# Patient Record
Sex: MALE | Race: BLACK OR AFRICAN AMERICAN
[De-identification: names, ages, dates, MRNs, and addresses within clinical notes are randomized per-mention and may not be internally consistent; named-entity substitution may affect disease eponyms.]

---

## 2019-03-22 ENCOUNTER — HOSPITAL ENCOUNTER (EMERGENCY)
Dept: HOSPITAL 52 - LL.ED | Age: 44
Discharge: HOME | End: 2019-03-22
Payer: COMMERCIAL

## 2019-03-22 DIAGNOSIS — S01.81XA: Primary | ICD-10-CM
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
## 2019-03-22 DIAGNOSIS — F17.210: ICD-10-CM

## 2019-03-22 DIAGNOSIS — I10: ICD-10-CM

## 2019-03-22 DIAGNOSIS — K02.9: ICD-10-CM

## 2019-03-22 DIAGNOSIS — Z23: ICD-10-CM

## 2019-03-22 NOTE — EDM.PDOC
ED HPI GENERAL MEDICAL PROBLEM





- General


Chief Complaint: General


Stated Complaint: NOSE LACERATION


Time Seen by Provider: 03/22/19 06:10


Source of Information: Reports: Patient.  Denies: Old Records (No [facility] records available)


History Limitations: Reports: No Limitations





- History of Present Illness


INITIAL COMMENTS - FREE TEXT/NARRATIVE: 





The patient was brought to the emergency room via transport vehicle from [facility] 

for evaluation of a Workmen's Compensation injury with a small nasal laceration

, which occurred at work at about 5 AM. Note the patient's accidentally caught 

his nose on a hoist with no contusion, other head injury, headaches, visual 

changes, foreign body, neurological  deficits, or other complaints or injuries. 

No recent history of abdominal pain, heartburn, nausea, diarrhea, melena, gross 

hematochezia, or any food intolerance, including fatty foods, etc.. The patient 

also denies any recent fever, cough, wheezing, dyspnea, etc.. He does 


not have a previous history of hypertension. No history of fall, etc.


Onset: Today, Sudden


Onset Date: 03/22/19


Onset Time: 05:00


Duration: Constant


Location: Reports: Face.  Denies: Head, Neck, Chest, Abdomen, Back, Pelvis, 

Upper Extremity, Left, Upper Extremity, Right


Quality: Reports: Same as Previous Episode, Throbbing


Severity: Mild


Improves with: Reports: None


Worsens with: Reports: None


Context: Reports: Trauma


Associated Symptoms: Reports: No Other Symptoms.  Denies: Confusion, Chest Pain

, Cough, Diaphoresis, Fever/Chills, Headaches, Loss of Appetite, Malaise, Nausea

/Vomiting, Rash, Seizure, Shortness of Breath, Syncope, Weakness


Treatments PTA: Reports: Other (see below) (None)


  ** Left Nose


Pain Score (Numeric/FACES): 5





- Related Data


 Allergies











Allergy/AdvReac Type Severity Reaction Status Date / Time


 


No Known Allergies Allergy   Verified 03/22/19 06:04











Home Meds: 


 Home Meds





. [No Known Home Meds]  03/22/19 [History]











Past Medical History





- Past Health History


Medical/Surgical History: Denies Medical/Surgical History


Cardiovascular History: Denies: Hypertension





Social & Family History





- Tobacco Use


Smoking Status *Q: Current Some Day Smoker


Tobacco Use Within Last Twelve Months: Cigarettes


Years of Tobacco use: 1


Packs/Tins Daily: 0.2


Used Tobacco, but Quit: No


Smoking Cessation Information Provided To Patient: Yes





- Caffeine Use


Caffeine Use: Reports: Coffee (1 cup per day), Energy Drinks (2 cans per week), 

Soda (2 sodas per week).  Denies: Tea





- Living Situation & Occupation


Occupation: Employed ([employer])





ED ROS GENERAL





- Review of Systems


Review Of Systems: ROS reveals no pertinent complaints other than HPI.





ED EXAM, GENERAL





- Physical Exam


Exam: See Below


Exam Limited By: No Limitations


General Appearance: Alert, WD/WN, No Apparent Distress


Eye Exam: Bilateral Eye: EOMI, Normal Inspection (No nystagmus), PERRL


Ears: Normal External Exam, Normal Canal, Hearing Grossly Normal, Normal TMs


Nose: Other (1 cm in length irregular laceration/tear over the left distal 

superior nasal region and naris with only minimal involvement of the inner 

mucosa).  No: No Blood (Minimal spotting and bleeding), Nasal Deformity, Nasal 

Swelling, Nasal Drainage, Nasal Flaring


Throat/Mouth: Normal Lips, Normal Gums, Normal Oropharynx, Normal Voice, No 

Airway Compromise.  No: Normal Teeth (Multiple missing teeth and broken teeth 

into the gumline including caries but no acute abscess, drainage, etc.), 

Dysphagia, Perioral Cyanosis


Head: Atraumatic, Normocephalic.  No: Facial Swelling, Facial Tenderness, Sinus 

Tenderness


Neck: Normal Inspection, Supple, Non-Tender, Full Range of Motion.  No: 

Lymphadenopathy (L), Lymphadenopathy (R), Thyromegaly


Respiratory/Chest: No Respiratory Distress, Lungs Clear, Normal Breath Sounds, 

No Accessory Muscle Use, Chest Non-Tender.  No: Pleural Rub, Retractions


Cardiovascular: Normal Peripheral Pulses, Regular Rate, Rhythm, No Edema, No 

Gallop, No JVD, No Murmur, No Rub.  No: Gallop/S3, Gallop/S4, Friction Rub


Peripheral Pulses: 2+: Radial (L), Radial (R)


GI/Abdominal: Normal Bowel Sounds, Soft, Non-Tender, No Organomegaly, No 

Distention, No Abnormal Bruit, No Mass, Pelvis Stable


 (Male) Exam: Deferred


Rectal (Males) Exam: Deferred


Back Exam: Normal Inspection, Full Range of Motion.  No: CVA Tenderness (L), 

CVA Tenderness (R), Muscle Spasm


Extremities: Normal Inspection, Normal Range of Motion, Non-Tender, No Pedal 

Edema, Normal Capillary Refill.  No: Nico's Sign


Neurological: Alert, Oriented, CN II-XII Intact, Normal Cognition, Normal Gait, 

No Motor/Sensory Deficits


Psychiatric: Normal Affect, Normal Mood


Skin Exam: Warm, Dry, Intact, Normal Color, No Rash, Stud(s), Tattoo(s), Wound/

Incision (As above).  No: Diaphoretic


Lymphatic: No Adenopathy





ED GENERAL MEDICAL PROCEDURES





- Laceration/Wound Repair


  ** Left Distal Nose


Lac/wound length in cm: 1.0


Appearance: Superficial, Stellate, Irregular


Distal NVT: Neuro & Vascular Intact, No Tendon Injury


Anesthetic Type: Other (None)


Skin Prep: Providone-Iodine (Betadine)


Saline irrigation (cc's): 0


Exploration/Debridement/Repair: Wound Explored, In a Bloodless Field, Explored 

to Base, No Foreign Material Found, Multiple Flaps Aligned


Closed with: Wound Adhesive


Sterile Dressing Applied: None


Tetanus Status Addressed: Yes


Complications: No





Course





- Vital Signs


Last Recorded V/S: 


 Last Vital Signs











Temp  36.8 C   03/22/19 05:54


 


Pulse  64   03/22/19 05:54


 


Resp  16   03/22/19 05:54


 


BP  146/90 H  03/22/19 06:05


 


Pulse Ox  100   03/22/19 05:54











 Vital Signs - 24 hr











  03/22/19 03/22/19





  05:54 06:05


 


Temperature [ 36.8 C 





Temporal]  


 


Pulse, 64 





Peripheral [  





Pulse Oximetry]  


 


Respiratory 16 





Rate  


 


Blood Pressure 140/94 H 146/90 H





[Right Upper  





Arm]  


 


O2 Sat by Pulse 100 





Oximetry  














- Orders/Labs/Meds


Orders: 


 Active Orders 24 hr











 Category Date Time Status


 


 Vaccines to be Administered [RC] PER UNIT ROUTINE Care  03/22/19 06:24 Active


 


 Obtain Past Medical Record [OM.PC] Routine Oth  03/22/19 06:24 Active











Labs: 


None


Meds: 


Medications














Discontinued Medications














Generic Name Dose Route Start Last Admin





  Trade Name Freq  PRN Reason Stop Dose Admin


 


Diphtheria/Tetanus/Acell Pertussis  0.5 ml  03/22/19 06:24  03/22/19 06:31





  Adacel  IM  03/22/19 06:25  0.5 ml





  .ONCE ONE   Administration





     





     





     





     








Dermabond





- Radiology Interpretation


Free Text/Narrative:: 





None





Departure





- Departure


Time of Disposition: 07:00


Disposition: Home, Self-Care 01


Condition: Good


Clinical Impression: 


 Laceration, Blood pressure elevated without history of HTN, Tobacco abuse 

counseling, Caries








- Discharge Information


*PRESCRIPTION DRUG MONITORING PROGRAM REVIEWED*: Not Applicable


*COPY OF PRESCRIPTION DRUG MONITORING REPORT IN PATIENT LIT: Not Applicable


Instructions:  Steps to Quit Smoking, Easy-to-Read, Tissue Adhesive Wound Care, 

Easy-to-Read


Referrals: 


PCP,None [Primary Care Provider] - 


Forms:  ED Department Discharge


Additional Instructions: 


1.  Follow up with your regular provider in 10-14 days as needed, if symptoms 

persist. Bring these discharge instructions with you to that visit..


2.  Tylenol 650 mg by mouth every 4 hours and/or OTC ibuprofen 2-3 tabs by 

mouth every 6 hours with food as directed./needed. You may stagger these 

medications for 48-72 hours only, which essentially means that you are 

receiving a pain medication about every 2 hours.


3.  Continue to observe your blood pressure closely through company nurse and 

regular provider.


4.  Stop all tobacco use ASAP as directed/per provided information and consider 

contacting Quit LIne, etc..


5.  Immediately after this visit verify that your cellular telephone's 

voicemail has been activated and is empty. Also verify that your  home telephone

's answering machine is operating properly and has space to receive messages. 

Note that it is sometimes necessary for us to be able to contact you at a later 

date to discuss your medical care.


6.  Please remember that we are ALWAYS here for you and want to answer any 

questions you may have. Feel free to call the hospital any time and we call you 

back ASAP. 


7.  Work excuse- See Form


8.  Follow-up with your dentist ASAP as discussed.





- Problem List & Annotations


(1) Laceration


SNOMED Code(s): 413108668


   Code(s): AZI2694 -    Status: Acute   Priority: High   Current Visit: Yes   

Onset Date: 03/22/19   Annotation/Comment:: Good results with laceration repair 

as above. Wound care discussed. [employer] Workmen's Compensation forms were 

completed. DTaP was given.   





(2) Caries


SNOMED Code(s): 94147411


   Code(s): K02.9 - DENTAL CARIES, UNSPECIFIED   Status: Chronic   Priority: 

Medium   Current Visit: Yes   Annotation/Comment:: The patient was advised to 

follow-up with the dentist ASAP with applications of caries discussed.   





(3) Blood pressure elevated without history of HTN


SNOMED Code(s): 178185549


   Code(s): R03.0 - ELEVATED BLOOD-PRESSURE READING, W/O DIAGNOSIS OF HTN   

Status: Acute   Priority: Medium   Current Visit: Yes   Annotation/Comment:: 

Elevated blood pressure today with no history of hypertension. Close follow-up 

with regular provider, etc. as per discharge instructions. Decreased caffeine 

intake, etc. discussed including avoidance of energy drinks, etc.   





(4) Tobacco abuse counseling


SNOMED Code(s): 295036640, 324018404, 271588730


   Code(s): Z71.6 - TOBACCO ABUSE COUNSELING   Status: Chronic   Priority: 

Medium   Current Visit: Yes   Annotation/Comment:: Tobacco cessation strongly 

encouraged with information provided at discharge.   





- Problem List Review


Problem List Initiated/Reviewed/Updated: Yes





- My Orders


Last 24 Hours: 


My Active Orders





03/22/19 06:24


Vaccines to be Administered [RC] PER UNIT ROUTINE 


Obtain Past Medical Record [OM.PC] Routine 














- Assessment/Plan


Last 24 Hours: 


My Active Orders





03/22/19 06:24


Vaccines to be Administered [RC] PER UNIT ROUTINE 


Obtain Past Medical Record [OM.PC] Routine 











Assessment:: 





As above


Plan: 





As above. Extensive precautions were given to the patient, who is in agreement 

with the treatment plan. See Patient Instructions for further treatment and 

plan.

## 2020-08-24 NOTE — EDM.PDOC
ED HPI GENERAL MEDICAL PROBLEM





- General


Chief Complaint: Back Pain or Injury


Stated Complaint: lower left back injury


Time Seen by Provider: 08/24/20 05:00


Source of Information: Reports: Patient, Old Records ([facility]

EMR.  No paper hospital chart available.)


History Limitations: Reports: No Limitations





- History of Present Illness


INITIAL COMMENTS - FREE TEXT/NARRATIVE: 





The patient was brought to the emergency room via transport vehicle from [facility] 

for evaluation of a Workmen's Compensation injury, which occurred at about 12:30

AM this morning.  The patient was lifting a 50 pound tire when he had a twisting

lower back injury.  He did take 500 mg of Tylenol at 1 AM with no improvement in

symptoms.  He complains of 10/10 left-sided low back pain with minimal 

paresthesias but no significant sciatica, muscle weakness, neurological 

deficits, fall, other injury, or other complaints.  No recent history of 

abdominal pain, heartburn, nausea, diarrhea, melena, gross hematochezia, or any 

food intolerance, including fatty foods, etc..  The patient also denies any 

recent fever, cough, wheezing, dyspnea, etc..


Onset: Today, Sudden


Onset Date: 08/24/20


Onset Time: 00:30


Duration: Constant, Getting Worse


Location: Reports: Back.  Denies: Head, Face, Neck, Chest, Abdomen, Pelvis, 

Upper Extremity, Left, Upper Extremity, Right, Lower Extremity, Left, Lower 

Extremity, Right, Generalized, Radiates to


Quality: Reports: Same as Previous Episode, Sharp, Stabbing, Other (Spasms)


Severity: Severe


Improves with: Reports: None


Worsens with: Reports: None


Context: Reports: Lifting, Other (As above).  Denies: Sick Contact


Associated Symptoms: Denies: Confusion, Chest Pain, Cough, Diaphoresis, 

Headaches, Loss of Appetite, Nausea/Vomiting, Shortness of Breath, Syncope, 

Weakness


Treatments PTA: Reports: Acetaminophen


  ** lower left back


Pain Score (Numeric/FACES): 10





- Related Data


                                    Allergies











Allergy/AdvReac Type Severity Reaction Status Date / Time


 


No Known Allergies Allergy   Verified 08/24/20 04:40











Home Meds: 


                                    Home Meds





Cyclobenzaprine [Flexeril] 10 mg PO TID PRN #30 tab 08/24/20 [Rx]











Past Medical History





- Past Health History


Medical/Surgical History: Denies Medical/Surgical History


Cardiovascular History: Denies: Hypertension





Social & Family History





- Tobacco Use


Smoking Status *Q: Current Every Day Smoker


Tobacco Use Within Last Twelve Months: Cigarettes


Years of Tobacco use: 2


Packs/Tins Daily: 0.3


Used Tobacco, but Quit: No


Smoking Cessation Information Provided To Patient: Yes


Second Hand Smoke Exposure: No


Second Hand Smoke Education Provided: No





- Caffeine Use


Caffeine Use: Reports: Coffee (1 cup per day), Energy Drinks (2 cans per week), 

Soda (2 sodas per week).  Denies: Tea





- Living Situation & Occupation


Living situation: Reports: Single (3 children), Alone


Occupation: Employed ([employer])





ED ROS GENERAL





- Review of Systems


Review Of Systems: Comprehensive ROS is negative, except as noted in HPI.





ED EXAM,LOWER BACK PAIN/INJURY





- Physical Exam


Exam: See Below (Every had problems with blood pressure being elevated now just 

come in when you hurt your home)


Exam Limited By: No Limitations


General Appearance: Alert, WD/WN, No Apparent Distress


Head: Atraumatic, Normocephalic


Neck: Normal Inspection, Supple, Non-Tender, Full Range of Motion.  No: Ly

mphadenopathy (L), Lymphadenopathy (R), Thyromegaly


Respiratory/Chest: No Respiratory Distress, Lungs Clear, Normal Breath Sounds, 

No Accessory Muscle Use, Chest Non-Tender.  No: Pleural Rub, Retractions


Cardiovascular: Normal Peripheral Pulses, Regular Rate, Rhythm, No Edema, No 

Gallop, No JVD, No Murmur, No Rub.  No: Gallop/S3, Gallop/S4, Friction Rub


GI/Abdominal: Normal Bowel Sounds, Soft, Non-Tender, No Organomegaly, No 

Distention, No Abnormal Bruit, No Mass.  No: Guarding


 (Male) Exam: Deferred


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 

dangerous stuff going on in their home)


Back Exam: Decreased Range of Motion (Secondary to pain), Muscle Spasm (Mild 

left lower lumbar region), Paraspinal Tenderness (Moderate left lower lumbar 

region).  No: CVA Tenderness (L), CVA Tenderness (R), Vertebral Tenderness


Extremities: Normal Inspection, Normal Range of Motion, Non-Tender, No Pedal 

Edema, Normal Capillary Refill.  No: Nico's Sign


Neurological: Alert, Normal Mood/Affect, Normal Dorsiflexion, CN II-XII Intact, 

Normal Plantar Flexion, Normal Gait, Normal Reflexes, No Motor/Sensory Deficits,

 Oriented x 3


Psychiatric: Normal Affect, Normal Mood


Skin Exam: Warm, Dry, Intact, Normal Color, No Rash, Tattoo(s) (Multiple).  No: 

Diaphoretic, Wound/Incision


Lymphatic: No Adenopathy





Course





- Vital Signs


Last Recorded V/S: 


                                Last Vital Signs











Temp  37.1 C   08/24/20 04:44


 


Pulse  88   08/24/20 04:44


 


Resp  18   08/24/20 04:44


 


BP  151/95 H  08/24/20 04:57


 


Pulse Ox  100   08/24/20 04:44











                               Vital Signs - 24 hr











  08/24/20 08/24/20 08/24/20





  04:44 04:57 05:28


 


Temperature [ 37.1 C  





Temporal]   


 


Pulse, 88  





Peripheral [   





Pulse Oximetry]   


 


Respiratory 18  





Rate   


 


Blood Pressure 136/100 H 151/95 H 140/88





[Left Upper Arm   





]   


 


O2 Sat by Pulse 100  





Oximetry   














- Orders/Labs/Meds


Orders: 


                               Active Orders 24 hr











 Category Date Time Status


 


 Lumbar Spine Min 4V [CR] Stat Exams  08/24/20 05:23 Ordered











Labs: 


None


Meds: 


Medications














Discontinued Medications














Generic Name Dose Route Start Last Admin





  Trade Name Freq  PRN Reason Stop Dose Admin


 


Ketorolac Tromethamine  60 mg  08/24/20 05:08  08/24/20 05:10





  Toradol  IM  08/24/20 05:09  60 mg





  ONETIME ONE   Administration














- Radiology Interpretation


Free Text/Narrative:: 





X-rays of the lumbar spine, complete including obliques, shows evidence of mild 

decreased lordosis, however no evidence of fracture, dislocation, etc..  Mildly 

suboptimal views secondary to multiple complaints, however adequate for 

evaluation.





Departure





- Departure


Time of Disposition: 05:55


Disposition: Home, Self-Care 01


Condition: Good


Clinical Impression: 


 Tobacco abuse counseling, Blood pressure elevated without history of HTN





Low back pain


Qualifiers:


 Chronicity: acute Back pain laterality: left Sciatica presence: without 

sciatica Qualified Code(s): M54.5 - Low back pain








- Discharge Information


*PRESCRIPTION DRUG MONITORING PROGRAM REVIEWED*: Not Applicable


*COPY OF PRESCRIPTION DRUG MONITORING REPORT IN PATIENT LIT: Not Applicable


Prescriptions: 


Cyclobenzaprine [Flexeril] 10 mg PO TID PRN #30 tab


 PRN Reason: Spasms


Instructions:  Steps to Quit Smoking, Easy-to-Read, Health Risks of Smoking, 

Muscle Strain, Easy-to-Read, Hypertension, Adult, Easy-to-Read, Back Injury Prev

ention


Forms:  ED Department Discharge


Additional Instructions: 


1.  Followup with your regular provider in 7-10 days as directed. Bring these 

discharge instructions with you to that visit.


2.  Tylenol 650 mg by mouth every 4 hours and/or OTC ibuprofen 2-3 tabs by mouth

 every 6 hours with food as directed./needed. You may stagger these medications 

for 48-72 hours only, which essentially means that you are receiving a pain 

medication about every 2 hours.  Next dose of ibuprofen as needed in 6 hours 

secondary to medications given in the emergency room.


3.  BenGay or equivalent, heating pad, and/or ice packs as directed.


4.  Advance activity slowly as directed with initial 10 pound lifting 

restriction.


5.  Work excuse- See Form


6.  Stop all tobacco use ASAP as directed/per provided information and consider 

contacting Quit LIne, etc..


7.  Have your regular provider observe your blood pressures and pulses closely


8.  Sedation, dry mouth, etc. precautions with Flexeril/cyclobenzaprine as 

discussed.


9.  Immediately after this visit verify that your cellular telephone's voicemail

 has been activated and is empty. Also verify that your  home telephone's 

answering machine is operating properly and has space to receive messages. Note 

that it is sometimes necessary for us to be able to contact you at a later date 

to discuss your medical care.


10.  Please remember that we are ALWAYS here for you and want to answer any 

questions you may have. Feel free to call the hospital any time and we call you 

back ASAP. 





Sepsis Event Note (ED)





- Evaluation


Sepsis Screening Result: No Definite Risk





- Focused Exam


Vital Signs: 


                                   Vital Signs











  Temp Pulse Resp BP Pulse Ox


 


 08/24/20 04:57     151/95 H 


 


 08/24/20 04:44  37.1 C  88  18  136/100 H  100














- Problem List & Annotations


(1) Low back pain


SNOMED Code(s): 274570448


   Code(s): M54.5 - LOW BACK PAIN   Status: Acute   Priority: High   Onset Date:

 08/24/20   Annotation/Comment:: IM Toradol given as above.  Symptomatic relief 

as per discharge instructions.  Note that the patient did have a similar injury 

at work about 2 months ago with [facility] [place] 

with x-rays at that time and also referral to a chiropractor by patient history.

  [employer] work excuse and Workmen's Compensation forms were completed.  Activity 

restrictions were discussed.  Sedation precautions with Flexeril were discussed.

   


Qualifiers: 


   Chronicity: acute   Back pain laterality: left   Sciatica presence: without 

sciatica   Qualified Code(s): M54.5 - Low back pain   





(2) Blood pressure elevated without history of HTN


SNOMED Code(s): 550663234


   Code(s): R03.0 - ELEVATED BLOOD-PRESSURE READING, W/O DIAGNOSIS OF HTN   

Status: Acute   Priority: Medium   Annotation/Comment:: Elevated blood pressure 

once again today with no history of hypertension.  Blood pressure improved prior

 to discharge without treatment other than Toradol as above. Close follow-up 

with regular provider, etc. as per discharge instructions. Decreased caffeine 

intake, etc. discussed including avoidance of energy drinks, etc.   





(3) Tobacco abuse counseling


SNOMED Code(s): 787087639, 002526625, 366145115


   Code(s): Z71.6 - TOBACCO ABUSE COUNSELING   Status: Chronic   Priority: 

Medium   Annotation/Comment:: Tobacco cessation once again strongly encouraged 

with information provided at discharge.   





- Problem List Review


Problem List Initiated/Reviewed/Updated: Yes





- My Orders


Last 24 Hours: 


My Active Orders





08/24/20 05:23


Lumbar Spine Min 4V [CR] Stat 














- Assessment/Plan


Last 24 Hours: 


My Active Orders





08/24/20 05:23


Lumbar Spine Min 4V [CR] Stat 











Assessment:: 





As above


Plan: 





As above.  Extensive precautions were given to the patient, who is in agreement 

with the treatment plan.  See Patient Instructions for further treatment and 

plan.

## 2021-05-04 NOTE — EDM.PDOC
ED HPI GENERAL MEDICAL PROBLEM





- General


Chief Complaint: Upper Extremity Injury/Pain


Stated Complaint: left shoulder


Time Seen by Provider: 05/04/21 02:10


Source of Information: Reports: Patient


History Limitations: Reports: No Limitations





- History of Present Illness


INITIAL COMMENTS - FREE TEXT/NARRATIVE: 





Pt presents with pain in the back of his left shoulder  Getting worse tonight  

Worse with use or movement  No known trauma  


Onset: Gradual


Duration: Day(s):


Location: Reports: Upper Extremity, Left


Severity: Moderate


Improves with: Reports: Immobilization


Worsens with: Reports: Movement


Treatments PTA: Reports: Cold Therapy


  ** Left Upper Shoulder


Pain Score (Numeric/FACES): 9





- Related Data


                                    Allergies











Allergy/AdvReac Type Severity Reaction Status Date / Time


 


almond Allergy  Facial Verified 05/04/21 01:56





   Swelling  


 


shellfish derived Allergy  Facial Verified 05/04/21 01:56





   Swelling  














Past Medical History





- Past Health History


Medical/Surgical History: Denies Medical/Surgical History





Social & Family History





- Tobacco Use


Tobacco Use Status *Q: Light Tobacco User


Years of Tobacco use: 1


Packs/Tins Daily: 0.2


Used Tobacco, but Quit: No


Second Hand Smoke Exposure: No





- Caffeine Use


Caffeine Use: Reports: Coffee





- Recreational Drug Use


Recreational Drug Use: No





- Living Situation & Occupation


Living situation: Reports: Single (3 children), Alone


Occupation: Employed ([employer])





Review of Systems





- Review of Systems


Review Of Systems: See Below


Musculoskeletal: Reports: Joint Pain





ED EXAM, GENERAL





- Physical Exam


Exam: See Below


Extremities: Other (Left shoulder tender posteriorly  No deformity Increased 

pain with movement)





Course





- Vital Signs


Last Recorded V/S: 





                                Last Vital Signs











Temp  97.4 F   05/04/21 02:00


 


Pulse  63   05/04/21 02:00


 


Resp  16   05/04/21 02:00


 


BP  157/105 H  05/04/21 02:00


 


Pulse Ox  99   05/04/21 02:00














- Orders/Labs/Meds


Orders: 





                               Active Orders 24 hr











 Category Date Time Status


 


 Shoulder Comp Lt [CR] Stat Exams  05/04/21 02:05 Taken














- Re-Assessments/Exams


Free Text/Narrative Re-Assessment/Exam: 





05/04/21 02:44


Xray:  No fracture or dislocation





Departure





- Departure


Time of Disposition: 02:45


Disposition: Home, Self-Care 01


Clinical Impression: 


Shoulder pain, left


Qualifiers:


 Chronicity: acute Qualified Code(s): M25.512 - Pain in left shoulder








- Discharge Information


*PRESCRIPTION DRUG MONITORING PROGRAM REVIEWED*: Not Applicable


*COPY OF PRESCRIPTION DRUG MONITORING REPORT IN PATIENT LIT: Not Applicable


Instructions:  Shoulder Pain


Additional Instructions: 


Tylenol or Motrin as needed


Follow up in clinic





Sepsis Event Note (ED)





- Evaluation


Sepsis Screening Result: No Definite Risk





- Focused Exam


Vital Signs: 





                                   Vital Signs











  Temp Pulse Resp BP Pulse Ox


 


 05/04/21 02:00  97.4 F  63  16  157/105 H  99














- My Orders


Last 24 Hours: 





My Active Orders





05/04/21 02:05


Shoulder Comp Lt [CR] Stat 














- Assessment/Plan


Last 24 Hours: 





My Active Orders





05/04/21 02:05


Shoulder Comp Lt [CR] Stat